# Patient Record
Sex: FEMALE | Race: NATIVE HAWAIIAN OR OTHER PACIFIC ISLANDER | NOT HISPANIC OR LATINO | Employment: FULL TIME | URBAN - NONMETROPOLITAN AREA
[De-identification: names, ages, dates, MRNs, and addresses within clinical notes are randomized per-mention and may not be internally consistent; named-entity substitution may affect disease eponyms.]

---

## 2017-02-09 ENCOUNTER — NON-PROVIDER VISIT (OUTPATIENT)
Dept: URGENT CARE | Facility: PHYSICIAN GROUP | Age: 32
End: 2017-02-09

## 2017-02-09 DIAGNOSIS — Z02.1 PRE-EMPLOYMENT DRUG SCREENING: ICD-10-CM

## 2017-02-09 LAB
AMP AMPHETAMINE: NORMAL
COC COCAINE: NORMAL
INT CON NEG: NORMAL
INT CON POS: NORMAL
MET METHAMPHETAMINES: NORMAL
OPI OPIATES: NORMAL
PCP PHENCYCLIDINE: NORMAL
POC DRUG COMMENT 753798-OCCUPATIONAL HEALTH: NEGATIVE
THC: NORMAL

## 2017-02-09 PROCEDURE — 80305 DRUG TEST PRSMV DIR OPT OBS: CPT | Performed by: PHYSICIAN ASSISTANT

## 2017-03-29 ENCOUNTER — APPOINTMENT (OUTPATIENT)
Dept: RADIOLOGY | Facility: MEDICAL CENTER | Age: 32
End: 2017-03-29
Attending: EMERGENCY MEDICINE

## 2017-03-29 ENCOUNTER — HOSPITAL ENCOUNTER (EMERGENCY)
Facility: MEDICAL CENTER | Age: 32
End: 2017-03-29
Attending: EMERGENCY MEDICINE

## 2017-03-29 VITALS
SYSTOLIC BLOOD PRESSURE: 110 MMHG | WEIGHT: 140 LBS | OXYGEN SATURATION: 98 % | BODY MASS INDEX: 23.9 KG/M2 | HEIGHT: 64 IN | HEART RATE: 84 BPM | TEMPERATURE: 98.3 F | DIASTOLIC BLOOD PRESSURE: 75 MMHG | RESPIRATION RATE: 16 BRPM

## 2017-03-29 DIAGNOSIS — S16.1XXA NECK STRAIN, INITIAL ENCOUNTER: ICD-10-CM

## 2017-03-29 PROCEDURE — 307740 HCHG GREEN TRAUMA TEAM SERVICES

## 2017-03-29 PROCEDURE — 99284 EMERGENCY DEPT VISIT MOD MDM: CPT

## 2017-03-29 PROCEDURE — 72125 CT NECK SPINE W/O DYE: CPT

## 2017-03-29 PROCEDURE — 70450 CT HEAD/BRAIN W/O DYE: CPT

## 2017-03-29 PROCEDURE — 71010 DX-CHEST-PORTABLE (1 VIEW): CPT

## 2017-03-29 RX ORDER — CYCLOBENZAPRINE HCL 10 MG
10 TABLET ORAL 3 TIMES DAILY PRN
Qty: 20 TAB | Refills: 0 | Status: SHIPPED | OUTPATIENT
Start: 2017-03-29

## 2017-03-29 RX ORDER — CYCLOBENZAPRINE HCL 10 MG
10 TABLET ORAL 3 TIMES DAILY PRN
Qty: 20 TAB | Refills: 0 | Status: SHIPPED | OUTPATIENT
Start: 2017-03-29 | End: 2017-03-29

## 2017-03-29 NOTE — ED AVS SNAPSHOT
Home Care Instructions                                                                                                                Princess Zhu   MRN: [id number]    Department:  [facility], Emergency Dept   Date of Visit:  3/29/2017            [facility], Emergency Dept    [address] [place] [id number]    Phone:  [phone]      You were seen by     Derick Jim M.D.      Your Diagnosis Was     Neck strain, initial encounter     S16.1XXA       Follow-up Information     1. Follow up with [facility], Emergency Dept.    Specialty:  Emergency Medicine    Why:  If symptoms worsen or change.     Contact information    [address] [place] [id number] [phone]      Medication Information     Review all of your home medications and newly ordered medications with your primary doctor and/or pharmacist as soon as possible. Follow medication instructions as directed by your doctor and/or pharmacist.     Please keep your complete medication list with you and share with your physician. Update the information when medications are discontinued, doses are changed, or new medications (including over-the-counter products) are added; and carry medication information at all times in the event of emergency situations.               Medication List      START taking these medications        Instructions    Morning Afternoon Evening Bedtime    cyclobenzaprine 10 MG Tabs   Commonly known as:  FLEXERIL        Take 1 Tab by mouth 3 times a day as needed.   Dose:  10 mg                             Where to Get Your Medications      You can get these medications from any pharmacy     Bring a paper prescription for each of these medications    - cyclobenzaprine 10 MG Tabs            Procedures and tests performed during your visit     CT-CSPINE WITHOUT PLUS RECONS    CT-HEAD W/O    DX-CHEST-PORTABLE (1 VIEW)        Discharge Instructions       Cervical  Sprain  A cervical sprain is when the tissues (ligaments) that hold the neck bones in place stretch or tear.  HOME CARE   · Put ice on the injured area.  ¨ Put ice in a plastic bag.  ¨ Place a towel between your skin and the bag.  ¨ Leave the ice on for 15-20 minutes, 3-4 times a day.  · You may have been given a collar to wear. This collar keeps your neck from moving while you heal.  ¨ Do not take the collar off unless told by your doctor.  ¨ If you have long hair, keep it outside of the collar.  ¨ Ask your doctor before changing the position of your collar. You may need to change its position over time to make it more comfortable.  ¨ If you are allowed to take off the collar for cleaning or bathing, follow your doctor's instructions on how to do it safely.  ¨ Keep your collar clean by wiping it with mild soap and water. Dry it completely. If the collar has removable pads, remove them every 1-2 days to hand wash them with soap and water. Allow them to air dry. They should be dry before you wear them in the collar.  ¨ Do not drive while wearing the collar.  · Only take medicine as told by your doctor.  · Keep all doctor visits as told.  · Keep all physical therapy visits as told.  · Adjust your work station so that you have good posture while you work.  · Avoid positions and activities that make your problems worse.  · Warm up and stretch before being active.  GET HELP IF:  · Your pain is not controlled with medicine.  · You cannot take less pain medicine over time as planned.  · Your activity level does not improve as expected.  GET HELP RIGHT AWAY IF:   · You are bleeding.  · Your stomach is upset.  · You have an allergic reaction to your medicine.  · You develop new problems that you cannot explain.  · You lose feeling (become numb) or you cannot move any part of your body (paralysis).  · You have tingling or weakness in any part of your body.  · Your symptoms get worse. Symptoms include:  ¨ Pain, soreness,  stiffness, puffiness (swelling), or a burning feeling in your neck.  ¨ Pain when your neck is touched.  ¨ Shoulder or upper back pain.  ¨ Limited ability to move your neck.  ¨ Headache.  ¨ Dizziness.  ¨ Your hands or arms feel week, lose feeling, or tingle.  ¨ Muscle spasms.  ¨ Difficulty swallowing or chewing.  MAKE SURE YOU:   · Understand these instructions.  · Will watch your condition.  · Will get help right away if you are not doing well or get worse.     This information is not intended to replace advice given to you by your health care provider. Make sure you discuss any questions you have with your health care provider.     Document Released: 06/05/2009 Document Revised: 08/20/2014 Document Reviewed: 06/25/2014  [employer] Interactive Patient Education ©2016 [employer]            Patient Information     Patient Information    Following emergency treatment: all patient requiring follow-up care must return either to a private physician or a clinic if your condition worsens before you are able to obtain further medical attention, please return to the emergency room.     Billing Information    At [facility], we work to make the billing process streamlined for our patients.  Our Representatives are here to answer any questions you may have regarding your hospital bill.  If you have insurance coverage and have supplied your insurance information to us, we will submit a claim to your insurer on your behalf.  Should you have any questions regarding your bill, we can be reached online or by phone as follows:  Online: You are able pay your bills online or live chat with our representatives about any billing questions you may have. We are here to help Monday - Friday from 8:00am to 7:30pm and 9:00am - 12:00pm on Saturdays.  Please visit [email or web address]  for more information.   Phone:  [phone] or [phone]    Please note that your emergency physician, surgeon,  pathologist, radiologist, anesthesiologist, and other specialists are not employed by [facility] and will therefore bill separately for their services.  Please contact them directly for any questions concerning their bills at the numbers below:     Emergency Physician Services:  [phone]  [facility]:  [phone]  [facility]:  [phone]  [facility]:  [phone]    1. Your final bill may vary from the amount quoted upon discharge if all procedures are not complete at that time, or if your doctor has additional procedures of which we are not aware. You will receive an additional bill if you return to the Emergency Department at [facility] for suture removal regardless of the facility of which the sutures were placed.     2. Please arrange for settlement of this account at the emergency registration.    3. All self-pay accounts are due in full at the time of treatment.  If you are unable to meet this obligation then payment is expected within 4-5 days.     4. If you have had radiology studies (CT, X-ray, Ultrasound, MRI), you have received a preliminary result during your emergency department visit. Please contact the radiology department [phone] to receive a copy of your final result. Please discuss the Final result with your primary physician or with the follow up physician provided.     Crisis Hotline:  [place] Crisis Hotline:  9-288-VLLHQBV or 1-375.133.5874  [place] Crisis Hotline:    1-431.816.2501 or 116-756-1587         ED Discharge Follow Up Questions    1. In order to provide you with very good care, we would like to follow up with a phone call in the next few days.  May we have your permission to contact you?     YES /  NO    2. What is the best phone number to call you? (       )_____-__________    3. What is the best time to call you?      Morning  /  Afternoon  /  Evening                   Patient Signature:   ____________________________________________________________    Date:  ____________________________________________________________

## 2017-03-29 NOTE — ED AVS SNAPSHOT
The [employer] Access Code: [email or web address]  Expires: 4/28/2017  8:02 PM    Your email address is not on file at [facility].  Email Addresses are required for you to sign up for [employer], please contact [phone] to verify your personal information and to provide your email address prior to attempting to register for [employer].    [facility] [place] [address]    [employer]  A secure, online tool to manage your health information     [facility]’s [employer]® is a secure, online tool that connects you to your personalized health information from the privacy of your home -- day or night - making it very easy for you to manage your healthcare. Once the activation process is completed, you can even access your medical information using the [employer] alisa, which is available for free in the Apple Alisa store or Google Play store.     To learn more about [employer], visit [email or web address]    There are two levels of access available (as shown below):   My Chart Features  [facility] Primary Care Doctor [facility]  Specialists [facility]  Urgent  Care Non-[facility] Primary Care Doctor   Email your healthcare team securely and privately 24/7 X X X    Manage appointments: schedule your next appointment; view details of past/upcoming appointments X      Request prescription refills. X      View recent personal medical records, including lab and immunizations X X X X   View health record, including health history, allergies, medications X X X X   Read reports about your outpatient visits, procedures, consult and ER notes X X X X   See your discharge summary, which is a recap of your hospital and/or ER visit that includes your diagnosis, lab results, and care plan X X  X     How to register for [employer]:  Once your e-mail address has been verified, follow the following steps to sign up for [employer].     1. Go to  [email or web address]  2. Click on the Sign Up Now box, which takes you to the New Member Sign Up page. You will need  to provide the following information:  a. Enter your [employer] Access Code exactly as it appears at the top of this page. (You will not need to use this code after you’ve completed the sign-up process. If you do not sign up before the expiration date, you must request a new code.)   b. Enter your date of birth.   c. Enter your home email address.   d. Click Submit, and follow the next screen’s instructions.  3. Create a [employer] ID. This will be your [employer] login ID and cannot be changed, so think of one that is secure and easy to remember.  4. Create a [employer] password. You can change your password at any time.  5. Enter your Password Reset Question and Answer. This can be used at a later time if you forget your password.   6. Enter your e-mail address. This allows you to receive e-mail notifications when new information is available in [employer].  7. Click Sign Up. You can now view your health information.    For assistance activating your [employer] account, call [phone]

## 2017-03-29 NOTE — ED AVS SNAPSHOT
3/29/2017          Princess Zhu  [place] [address]    Dear :    [facility] wants to ensure your discharge home is safe and you or your loved ones have had all your questions answered regarding your care after you leave the hospital.    You may receive a telephone call within two days of your discharge.  This call is to make certain you understand your discharge instructions as well as ensure we provided you with the best care possible during your stay with us.     The call will only last approximately 3-5 minutes and will be done by a nurse.    Once again, we want to ensure your discharge home is safe and that you have a clear understanding of any next steps in your care.  If you have any questions or concerns, please do not hesitate to contact us, we are here for you.  Thank you for choosing [facility] for your healthcare needs.    Sincerely,    Mauricio Moraes    [facility]

## 2017-03-30 NOTE — ED NOTES
Pt given discharge and follow up instructions and prescriptions, all questions answered, pt verbalized understanding. Pt discharged with self. Copy of discharge provided to pt. Signed copy in chart.  Pt states that all personal belongings are in possession. Pt ambulatory off unit.
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
## 2020-04-15 ENCOUNTER — OFFICE VISIT (OUTPATIENT)
Dept: URGENT CARE | Facility: CLINIC | Age: 35
End: 2020-04-15
Payer: COMMERCIAL

## 2020-04-15 VITALS
HEART RATE: 94 BPM | OXYGEN SATURATION: 98 % | DIASTOLIC BLOOD PRESSURE: 68 MMHG | SYSTOLIC BLOOD PRESSURE: 100 MMHG | HEIGHT: 64 IN | WEIGHT: 164 LBS | RESPIRATION RATE: 14 BRPM | TEMPERATURE: 98.7 F | BODY MASS INDEX: 28 KG/M2

## 2020-04-15 DIAGNOSIS — Z3A.01 LESS THAN 8 WEEKS GESTATION OF PREGNANCY: ICD-10-CM

## 2020-04-15 LAB
APPEARANCE UR: CLEAR
BILIRUB UR STRIP-MCNC: NEGATIVE MG/DL
COLOR UR AUTO: YELLOW
GLUCOSE UR STRIP.AUTO-MCNC: NEGATIVE MG/DL
INT CON NEG: NORMAL
INT CON POS: NORMAL
KETONES UR STRIP.AUTO-MCNC: NORMAL MG/DL
LEUKOCYTE ESTERASE UR QL STRIP.AUTO: NEGATIVE
NITRITE UR QL STRIP.AUTO: NEGATIVE
PH UR STRIP.AUTO: 6 [PH] (ref 5–8)
POC URINE PREGNANCY TEST: POSITIVE
PROT UR QL STRIP: NEGATIVE MG/DL
RBC UR QL AUTO: NEGATIVE
SP GR UR STRIP.AUTO: 1.02
UROBILINOGEN UR STRIP-MCNC: NEGATIVE MG/DL

## 2020-04-15 PROCEDURE — 81002 URINALYSIS NONAUTO W/O SCOPE: CPT | Performed by: PHYSICIAN ASSISTANT

## 2020-04-15 PROCEDURE — 81025 URINE PREGNANCY TEST: CPT | Performed by: PHYSICIAN ASSISTANT

## 2020-04-15 PROCEDURE — 99204 OFFICE O/P NEW MOD 45 MIN: CPT | Performed by: PHYSICIAN ASSISTANT

## 2020-04-15 ASSESSMENT — ENCOUNTER SYMPTOMS
ABDOMINAL PAIN: 0
EYE DISCHARGE: 0
SORE THROAT: 0
SHORTNESS OF BREATH: 0
HEADACHES: 0
COUGH: 0
VOMITING: 0
FEVER: 0
EYE REDNESS: 0
NAUSEA: 1

## 2020-04-15 NOTE — PROGRESS NOTES
Subjective:      Princess krissy Jacob is a 34 y.o. female who presents with Pregnancy Test        HPI  This is a new problem.   The patient presents to clinic requesting a pregnancy test.  The patient states she has taken 2 home pregnancy tests, both of which were positive.  The patient would like confirmation that she is currently pregnant.  The patient's last menstrual period was 3/4/2020.  The patient states this was a normal menstrual period for her, lasting 6 days.  The patient states she was scheduled to get her menstrual period on 4/4, which she missed.  The patient states she is experiencing slight nausea in the mornings. She reports no associated vomiting. No abdominal pain.  No pelvic pain.  No abnormal vaginal bleeding.  No urinary symptoms.  No aggravating/alleviating factors the patient has not taken any medications for her current symptoms.    The patient reports a history of an ectopic pregnancy.  The patient states she developed an ectopic pregnancy with the Mirena IUD.  The patient states she is currently not taking any forms of birth control.    PMH:  has a past medical history of Migraines.  MEDS:   Current Outpatient Medications:   •  cyclobenzaprine (FLEXERIL) 10 MG Tab, Take 1 Tab by mouth 3 times a day as needed., Disp: 20 Tab, Rfl: 0  •  oyster shell calcium (OS-ARIANNA 500) 500 MG TABS, Take 1 Tab by mouth 2 times a day, with meals., Disp: 90 Tab, Rfl: 0  •  amoxicillin-clavulanate (AUGMENTIN) 875-125 MG TABS, Take 1 Tab by mouth 2 times a day., Disp: 20 Tab, Rfl: 0  •  methylPREDNISolone (MEDROL, LUCY,) 4 MG tablet, Take as directed, Disp: 1 Kit, Rfl: 0  •  amoxicillin-clavulanate (AUGMENTIN) 875-125 MG TABS, Take 1 Tab by mouth 2 times a day., Disp: , Rfl:   •  Calcium Carbonate (OS-ARIANNA PO), Take 500 mg by mouth 2 times a day, with meals., Disp: , Rfl:   •  methylPREDNISolone (MEDROL DOSPACK) 4 MG TABS, Take 4 mg by mouth every day. Take as directed, Disp: , Rfl:   •   "hydrocodone-acetaminophen (NORCO) 5-325 MG TABS per tablet, Take 1-2 Tabs by mouth every four hours as needed (mild pain)., Disp: 20 Tab, Rfl: 0  ALLERGIES:   Allergies   Allergen Reactions   • Aspirin      Eye swelling and hives     SURGHX:   Past Surgical History:   Procedure Laterality Date   • PELVISCOPY  8/21/2014    Performed by Damaris Coker M.D. at SUR[facility]ERY [facility]   • INTRA UTERINE DEVICE REMOVAL  8/21/2014    Performed by Damaris Coker M.D. at SUR[facility]ERY [facility]   • SALPINGECTOMY  8/21/2014    Performed by Damaris Coker M.D. at SUR[facility]ERY [facility]     SOCHX:  reports that she has quit smoking. Her smoking use included cigarettes. She smoked 0.10 packs per day. She has never used smokeless tobacco. She reports current alcohol use. She reports that she does not use drugs.  FH: Family history was reviewed, no pertinent findings to report    Review of Systems   Constitutional: Negative for fever.   HENT: Negative for congestion, ear pain and sore throat.    Eyes: Negative for discharge and redness.   Respiratory: Negative for cough and shortness of breath.    Cardiovascular: Negative for chest pain and leg swelling.   Gastrointestinal: Positive for nausea. Negative for abdominal pain and vomiting.   Genitourinary: Negative for dysuria.   Musculoskeletal: Negative for joint pain.   Skin: Negative for rash.   Neurological: Negative for headaches.   All other systems reviewed and are negative.         Objective:     /68 (BP Location: Right arm, Patient Position: Sitting)   Pulse 94   Temp 37.1 °C (98.7 °F)   Resp 14   Ht 1.626 m (5' 4\")   Wt 74.4 kg (164 lb)   SpO2 98%   BMI 28.15 kg/m²      Physical Exam  Constitutional:       General: She is not in acute distress.     Appearance: Normal appearance. She is not ill-appearing.   HENT:      Head: Normocephalic and atraumatic.      Right Ear: External ear normal.      Left Ear: External ear normal.      Nose: Nose normal.      " Mouth/Throat:      Mouth: Mucous membranes are moist.      Pharynx: Oropharynx is clear. No posterior oropharyngeal erythema.   Eyes:      Extraocular Movements: Extraocular movements intact.      Conjunctiva/sclera: Conjunctivae normal.   Neck:      Musculoskeletal: Normal range of motion and neck supple.   Cardiovascular:      Rate and Rhythm: Normal rate and regular rhythm.      Heart sounds: Normal heart sounds.   Pulmonary:      Effort: Pulmonary effort is normal. No respiratory distress.      Breath sounds: Normal breath sounds. No wheezing.   Abdominal:      Palpations: Abdomen is soft.      Tenderness: There is no abdominal tenderness. There is no guarding or rebound.   Musculoskeletal: Normal range of motion.   Skin:     General: Skin is warm and dry.   Neurological:      Mental Status: She is alert and oriented to person, place, and time.            Progress:  Results for orders placed or performed in visit on 04/15/20   POCT Pregnancy   Result Value Ref Range    POC Urine Pregnancy Test Positive Negative    Internal Control Positive Valid     Internal Control Negative Valid    POCT Urinalysis   Result Value Ref Range    POC Color Yellow Negative    POC Appearance Clear Negative    POC Leukocyte Esterase Negative Negative    POC Nitrites Negative Negative    POC Urobiligen Negative Negative (0.2) mg/dL    POC Protein Negative Negative mg/dL    POC Urine PH 6.0 5.0 - 8.0    POC Blood Negative Negative    POC Specific Gravity 1.025 <1.005 - >1.030    POC Ketones Trace Negative mg/dL    POC Bilirubin Negative Negative mg/dL    POC Glucose Negative Negative mg/dL     OB 1st Trimester with Transvaginal US - pending      Assessment/Plan:     1. Less than 8 weeks gestation of pregnancy  - POCT Pregnancy  - POCT Urinalysis  - US-OB 1ST TRIMESTER WITH TRANSVAGINAL (COMBO); Future    The patient's presenting symptoms and physical exam are consistent with pregnancy.  The patient presents to clinic requesting a  pregnancy test, as she does not trust the accuracy of the 2 tests that she took at home, both of which were positive.  The patient's POCT pregnancy test today in clinic was positive.  The patient's POCT urinalysis showed no signs of infection.  The patient's physical exam was normal.  The patient reports intermittent nausea.  She is currently not experiencing any abdominal pain, pelvic pain, or vaginal bleeding.  However, the patient reports a history of ectopic pregnancy.  Will order an ultrasound to further evaluate the patient's pregnancy.  Advised the patient to follow-up with her OB/GYN.  Discussed return precautions with the patient, and she verbalized understanding.    Differential diagnoses, supportive care, and indications for immediate follow-up discussed with patient.   Instructed to return to clinic or nearest emergency department for any change in condition, further concerns, or worsening of symptoms.    OTC Tylenol for fever/discomfort  Drink plenty of fluids  Follow-up with OB/GYN  Return to clinic or go to the ED if symptoms worsen or fail to improve, or if patient should develop abdominal pain, pelvic pain, abnormal vaginal bleeding, urinary symptoms, nausea/vomiting, decreased p.o. intake, fever/chills, and/or any concerning symptoms.    Discussed plan with the patient, and she agrees to the above.     Please note that this dictation was created using voice recognition software. I have made every reasonable attempt to correct obvious errors, but I expect that there may be errors of grammar and possibly content that I did not discover before finalizing the note.

## 2020-04-16 ENCOUNTER — HOSPITAL ENCOUNTER (OUTPATIENT)
Dept: RADIOLOGY | Facility: MEDICAL CENTER | Age: 35
End: 2020-04-16
Attending: PHYSICIAN ASSISTANT
Payer: COMMERCIAL

## 2020-04-16 ENCOUNTER — TELEPHONE (OUTPATIENT)
Dept: URGENT CARE | Facility: CLINIC | Age: 35
End: 2020-04-16

## 2020-04-16 DIAGNOSIS — Z3A.01 LESS THAN 8 WEEKS GESTATION OF PREGNANCY: ICD-10-CM

## 2020-04-16 PROCEDURE — 76817 TRANSVAGINAL US OBSTETRIC: CPT

## 2020-04-16 NOTE — TELEPHONE ENCOUNTER
4/16 @ 11:28PM    Spoke with the patient regarding the results of her ultrasound.  Recommend the patient follow-up with her OB.